# Patient Record
Sex: FEMALE | URBAN - METROPOLITAN AREA
[De-identification: names, ages, dates, MRNs, and addresses within clinical notes are randomized per-mention and may not be internally consistent; named-entity substitution may affect disease eponyms.]

---

## 2018-08-10 ENCOUNTER — HOSPITAL ENCOUNTER (EMERGENCY)
Age: 33
Discharge: HOME | End: 2018-08-10
Attending: EMERGENCY MEDICINE
Payer: COMMERCIAL

## 2018-08-10 VITALS
DIASTOLIC BLOOD PRESSURE: 68 MMHG | HEART RATE: 87 BPM | SYSTOLIC BLOOD PRESSURE: 111 MMHG | RESPIRATION RATE: 16 BRPM | TEMPERATURE: 98.4 F | OXYGEN SATURATION: 100 %

## 2018-08-10 DIAGNOSIS — T51.91XA ACCIDENTAL POISONING BY ALCOHOL, INITIAL ENCOUNTER: ICD-10-CM

## 2018-08-10 DIAGNOSIS — F10.920 ACUTE ALCOHOLIC INTOXICATION WITHOUT COMPLICATION (HCC): Primary | ICD-10-CM

## 2018-08-10 DIAGNOSIS — R11.10 VOMITING, INTRACTABILITY OF VOMITING NOT SPECIFIED, PRESENCE OF NAUSEA NOT SPECIFIED, UNSPECIFIED VOMITING TYPE: ICD-10-CM

## 2018-08-10 LAB
ACETAMINOPHEN LEVEL: <5 UG/ML (ref 10–30)
ETHANOL PERCENT: 0.29 %
ETHANOL: 291 MG/DL
HCG QUALITATIVE: NEGATIVE
SALICYLATE LEVEL: <1 MG/DL (ref 3–10)
TOXIC TRICYCLIC SC,BLOOD: NEGATIVE

## 2018-08-10 PROCEDURE — 99284 EMERGENCY DEPT VISIT MOD MDM: CPT

## 2018-08-10 PROCEDURE — G0480 DRUG TEST DEF 1-7 CLASSES: HCPCS

## 2018-08-10 PROCEDURE — 84703 CHORIONIC GONADOTROPIN ASSAY: CPT

## 2018-08-10 PROCEDURE — 80307 DRUG TEST PRSMV CHEM ANLYZR: CPT

## 2018-08-10 PROCEDURE — 2580000003 HC RX 258: Performed by: PHYSICIAN ASSISTANT

## 2018-08-10 RX ORDER — 0.9 % SODIUM CHLORIDE 0.9 %
1000 INTRAVENOUS SOLUTION INTRAVENOUS ONCE
Status: COMPLETED | OUTPATIENT
Start: 2018-08-10 | End: 2018-08-10

## 2018-08-10 RX ORDER — ONDANSETRON 2 MG/ML
4 INJECTION INTRAMUSCULAR; INTRAVENOUS ONCE
Status: DISCONTINUED | OUTPATIENT
Start: 2018-08-10 | End: 2018-08-10 | Stop reason: HOSPADM

## 2018-08-10 RX ADMIN — SODIUM CHLORIDE 1000 ML: 9 INJECTION, SOLUTION INTRAVENOUS at 02:40

## 2018-08-10 ASSESSMENT — ENCOUNTER SYMPTOMS
ABDOMINAL PAIN: 0
VOMITING: 1
DIARRHEA: 1
BACK PAIN: 0
SHORTNESS OF BREATH: 0

## 2018-08-10 NOTE — ED NOTES
Pt ambulated to restroom with steady and even gait. Denies any complaints at this time. Will continue to monitor.      Kilo Allen RN  08/10/18 2283

## 2018-08-10 NOTE — ED NOTES
Friends spoke to writer and state they will be available to pick pt up in the morning after her sober time. States to call [facility] @ [phone] for ride.      Scott Espinoza RN  08/10/18 7923

## 2018-08-10 NOTE — ED PROVIDER NOTES
[address]  ED  Emergency Department  Emergency Medicine Resident Sign-out     Care of Yamilet Lima was assumed from Dr. Mary So and is being seen for Alcohol Intoxication (Pt brought to ED for alcohol intoxication. Pt is A&Ox4 but has slurred speech. States she had several beers tonight. Denies any drug use. Denies any SI/HI or any hallucinations.)  . The patient's initial evaluation and plan have been discussed with the prior provider who initially evaluated the patient. EMERGENCY DEPARTMENT COURSE / MEDICAL DECISION MAKING:       MEDICATIONS GIVEN:  ED Medication Orders     Start Ordered     Status Ordering Provider    08/10/18 0230 08/10/18 0215  ondansetron (ZOFRAN) injection 4 mg  ONCE      Last MAR action:  Not Given - by Andrew Lucsa on 08/10/18 at [address] [facility] Zepeda    08/10/18 0230 08/10/18 0215  0.9 % sodium chloride bolus  ONCE      Last MAR action:  Stopped [facility]          LABS / RADIOLOGY:     Labs Reviewed   TOX SCR, BLD, ED - Abnormal; Notable for the following:        Result Value    Ethanol 608 (*)     Salicylate Lvl <1 (*)     Acetaminophen Level <5 (*)     All other components within normal limits   HCG, SERUM, QUALITATIVE       No results found. RECENT VITALS:     Temp: 98.4 °F (36.9 °C),  Pulse: 87, Resp: 16, BP: 111/68, SpO2: 100 %    This patient is a 33 y.o. Female with alcohol intoxication and nausea/vomiting. Alisha was in town for a wedding and was extremely intoxicated. She was vomiting and was brought in. Sober time around 10 AM.  Friends may come pick her up in AM.      OUTSTANDING TASKS / RECOMMENDATIONS:    1. Re-eval at sober time    Patient's friend came to . I spoke to this individual directly. They were ambulating without difficulty, no slurred speech, and clinically appear to be sober. Therefore, patient was discharged at that time.   Did provide instructions to avoid alcohol and drug

## 2018-08-10 NOTE — ED PROVIDER NOTES
for level 4, 10 or more for level 5)      Review of Systems   Constitutional: Negative for fever. HENT: Negative for ear pain. Eyes: Positive for visual disturbance (blurred). Respiratory: Negative for shortness of breath. Cardiovascular: Negative for chest pain. Gastrointestinal: Positive for diarrhea and vomiting. Negative for abdominal pain. Genitourinary: Negative for vaginal bleeding. Musculoskeletal: Negative for back pain and neck pain. Skin: Negative for wound. Neurological: Positive for dizziness. Negative for headaches. Psychiatric/Behavioral: Positive for confusion. Negative for self-injury and suicidal ideas. PHYSICAL EXAM   (up to 7 for level 4, 8 or more for level 5)      INITIAL VITALS:   ED Triage Vitals   BP Temp Temp src Pulse Resp SpO2 Height Weight   -- -- -- -- -- -- -- --       Physical Exam   Constitutional: She appears well-developed and well-nourished. He smells of alcohol and appears acutely intoxicated. HENT:   Head: Normocephalic and atraumatic. Right Ear: External ear normal.   Left Ear: External ear normal.   Nose: Nose normal.   Mouth/Throat: Oropharynx is clear and moist.   Eyes: Conjunctivae are normal. Pupils are equal, round, and reactive to light. Pupils are dilated at 6 and reactive. Neck: Normal range of motion. Neck supple. No JVD present. Cardiovascular: Normal rate, regular rhythm, normal heart sounds and intact distal pulses. Pulmonary/Chest: Effort normal and breath sounds normal. No respiratory distress. Abdominal: Soft. Bowel sounds are normal. She exhibits no distension. There is no tenderness. Musculoskeletal: Normal range of motion. Neurological: She is alert. She has normal strength. She is disoriented (Alert to person). No sensory deficit. GCS eye subscore is 3. GCS verbal subscore is 5. GCS motor subscore is 6. Patient is not oriented to place or time.   She follows commands and moves all extremities with normal strength. Patient was able to stand unassisted. Skin: Skin is warm and dry. Nursing note and vitals reviewed. DIFFERENTIAL  DIAGNOSIS     PLAN (LABS / IMAGING / EKG):  Orders Placed This Encounter   Procedures    TOX SCR, BLD, ED    HCG Qualitative, Serum    Saline lock IV       MEDICATIONS ORDERED:  Orders Placed This Encounter   Medications    DISCONTD: ondansetron (ZOFRAN) injection 4 mg    0.9 % sodium chloride bolus       DDX: Alcohol intoxication    Initial MDM/Plan: 33 y.o. female who presents with Alcohol intoxication and vomiting. Patient discussed Dr. Elier Stinson. Patient is alert, and oriented to person, not time or place. She's also alcoholic appears acutely conjugated. Her abdomen is soft, nontender nondistended. She has no abrasions or evidence of trauma. She denies any concomitant ingestion of any pills or drugs. She denies any suicidal or homicidal ideations. Will get detox screen to check alcohol level and pregnancy test.  We'll give IV fluids and Zofran. We'll plan on discharging patient once she is clinically sober, or is alert and oriented and has an adult she can be discharged home with. MDM    DIAGNOSTIC RESULTS / EMERGENCY DEPARTMENT COURSE / MDM     LABS:  Labs Reviewed   TOX SCR, BLD, ED - Abnormal; Notable for the following:        Result Value    Ethanol 034 (*)     Salicylate Lvl <1 (*)     Acetaminophen Level <5 (*)     All other components within normal limits   HCG, SERUM, QUALITATIVE         RADIOLOGY:  No results found. EKG      All EKG's are interpreted by the Emergency Department Physician who either signs or Co-signs this chart in the absence of a cardiologist.    EMERGENCY DEPARTMENT COURSE:  ED Course as of Aug 10 1831   Fri Aug 10, 2018   6824 Patient sober time is 10 AM.Patient is resting comfortably. Lesions SPO2 was 96% on room air per triage vital signs. They were miss recorded at 17%. He will have nurse update incorrect.   [CR]   0403 Walked patient to the restroom. Placed her back in the stretcher when she was finished. Patient denies any complaints. Advised her that she would be discharged later this morning. She is requesting to call her .  [CR]   7494 Patient workup discussed with ED resident, Dr. Yumiko Alcantara, who Agreed to assume care of the patient due to shift change.  [CR]      ED Course User Index  [CR] Denise Geronimo DO          PROCEDURES:  Procedures    CONSULTS:  None    CRITICAL CARE:  Please see attending note    FINAL IMPRESSION      1. Acute alcoholic intoxication without complication ([facility] [address]    2. Accidental poisoning by alcohol, initial encounter    3. Vomiting, intractability of vomiting not specified, presence of nausea not specified, unspecified vomiting type          DISPOSITION / PLAN     DISPOSITION        PATIENT REFERRED TO:  [facility]  [address]  Go to   As needed, If symptoms worsen    Primary Care Provider    Schedule an appointment as soon as possible for a visit in 1 week        DISCHARGE MEDICATIONS:  There are no discharge medications for this patient.       Denise Geronimo DO  Emergency Medicine Resident    (Please note that portions of this note were completed with a voice recognition program.  Efforts were made to edit the dictations but occasionally words are mis-transcribed.)       Denise Geronimo DO  Resident  08/10/18 7734 [facility],   Resident  08/10/18 0203

## 2018-08-10 NOTE — ED NOTES
Pt's sober ride is here at this time and has spoke with Dr. Nahid Orr. Awaiting DC papers. Will continue to monitor.       Jenelle Kehr, RN  08/10/18 3310

## 2022-06-27 ENCOUNTER — APPOINTMENT (RX ONLY)
Dept: URBAN - METROPOLITAN AREA CLINIC 168 | Facility: CLINIC | Age: 37
Setting detail: DERMATOLOGY
End: 2022-06-27

## 2022-06-27 DIAGNOSIS — L71.8 OTHER ROSACEA: ICD-10-CM

## 2022-06-27 DIAGNOSIS — L82.1 OTHER SEBORRHEIC KERATOSIS: ICD-10-CM

## 2022-06-27 DIAGNOSIS — Z71.89 OTHER SPECIFIED COUNSELING: ICD-10-CM

## 2022-06-27 DIAGNOSIS — D22 MELANOCYTIC NEVI: ICD-10-CM

## 2022-06-27 PROBLEM — D48.5 NEOPLASM OF UNCERTAIN BEHAVIOR OF SKIN: Status: ACTIVE | Noted: 2022-06-27

## 2022-06-27 PROBLEM — D22.9 MELANOCYTIC NEVI, UNSPECIFIED: Status: ACTIVE | Noted: 2022-06-27

## 2022-06-27 PROCEDURE — 99203 OFFICE O/P NEW LOW 30 MIN: CPT | Mod: 25

## 2022-06-27 PROCEDURE — ? COUNSELING

## 2022-06-27 PROCEDURE — ? BIOPSY BY SHAVE METHOD

## 2022-06-27 PROCEDURE — ? SUNSCREEN RECOMMENDATIONS

## 2022-06-27 PROCEDURE — 11102 TANGNTL BX SKIN SINGLE LES: CPT

## 2022-06-27 PROCEDURE — ? ADDITIONAL NOTES

## 2022-06-27 PROCEDURE — ? EDUCATIONAL RESOURCES PROVIDED

## 2022-06-27 ASSESSMENT — LOCATION SIMPLE DESCRIPTION DERM
LOCATION SIMPLE: LEFT INGUINAL FOLD
LOCATION SIMPLE: LOWER BACK

## 2022-06-27 ASSESSMENT — LOCATION ZONE DERM
LOCATION ZONE: LEG
LOCATION ZONE: TRUNK

## 2022-06-27 ASSESSMENT — LOCATION DETAILED DESCRIPTION DERM
LOCATION DETAILED: SUPERIOR LUMBAR SPINE
LOCATION DETAILED: LEFT INGUINAL FOLD

## 2022-06-27 NOTE — PROCEDURE: ADDITIONAL NOTES
Render Risk Assessment In Note?: no
Detail Level: Simple
Additional Notes: Pt notes excellent control with gentle skin care, Epionce products, and periodic IPL.

## 2023-09-26 ENCOUNTER — APPOINTMENT (RX ONLY)
Dept: URBAN - METROPOLITAN AREA CLINIC 168 | Facility: CLINIC | Age: 38
Setting detail: DERMATOLOGY
End: 2023-09-26
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
## 2023-09-26 DIAGNOSIS — L82.1 OTHER SEBORRHEIC KERATOSIS: ICD-10-CM

## 2023-09-26 DIAGNOSIS — Z71.89 OTHER SPECIFIED COUNSELING: ICD-10-CM

## 2023-09-26 DIAGNOSIS — D22 MELANOCYTIC NEVI: ICD-10-CM
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
## 2023-09-26 PROBLEM — D22.5 MELANOCYTIC NEVI OF TRUNK: Status: ACTIVE | Noted: 2023-09-26

## 2023-09-26 PROCEDURE — ? COUNSELING

## 2023-09-26 PROCEDURE — 99213 OFFICE O/P EST LOW 20 MIN: CPT

## 2023-09-26 PROCEDURE — ? SUNSCREEN RECOMMENDATIONS

## 2023-09-26 PROCEDURE — ? EDUCATIONAL RESOURCES PROVIDED

## 2023-09-26 ASSESSMENT — LOCATION DETAILED DESCRIPTION DERM
LOCATION DETAILED: LEFT MEDIAL UPPER BACK
LOCATION DETAILED: SUPERIOR LUMBAR SPINE

## 2023-09-26 ASSESSMENT — LOCATION ZONE DERM: LOCATION ZONE: TRUNK

## 2023-09-26 ASSESSMENT — LOCATION SIMPLE DESCRIPTION DERM
LOCATION SIMPLE: LOWER BACK
LOCATION SIMPLE: LEFT UPPER BACK

## 2024-07-16 ENCOUNTER — APPOINTMENT (RX ONLY)
Dept: URBAN - METROPOLITAN AREA CLINIC 168 | Facility: CLINIC | Age: 39
Setting detail: DERMATOLOGY
End: 2024-07-16

## 2024-07-16 DIAGNOSIS — D22 MELANOCYTIC NEVI: ICD-10-CM

## 2024-07-16 DIAGNOSIS — Z71.89 OTHER SPECIFIED COUNSELING: ICD-10-CM
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
## 2024-07-16 DIAGNOSIS — L82.1 OTHER SEBORRHEIC KERATOSIS: ICD-10-CM
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
## 2024-07-16 PROBLEM — D22.5 MELANOCYTIC NEVI OF TRUNK: Status: ACTIVE | Noted: 2024-07-16

## 2024-07-16 PROCEDURE — ? EDUCATIONAL RESOURCES PROVIDED

## 2024-07-16 PROCEDURE — ? COUNSELING

## 2024-07-16 PROCEDURE — ? SUNSCREEN RECOMMENDATIONS

## 2024-07-16 PROCEDURE — 99213 OFFICE O/P EST LOW 20 MIN: CPT

## 2024-07-16 ASSESSMENT — LOCATION DETAILED DESCRIPTION DERM
LOCATION DETAILED: SUPERIOR LUMBAR SPINE
LOCATION DETAILED: LEFT MEDIAL UPPER BACK

## 2024-07-16 ASSESSMENT — LOCATION SIMPLE DESCRIPTION DERM
LOCATION SIMPLE: LOWER BACK
LOCATION SIMPLE: LEFT UPPER BACK

## 2024-07-16 ASSESSMENT — LOCATION ZONE DERM: LOCATION ZONE: TRUNK
